# Patient Record
Sex: FEMALE | Race: BLACK OR AFRICAN AMERICAN | NOT HISPANIC OR LATINO | Employment: OTHER | URBAN - METROPOLITAN AREA
[De-identification: names, ages, dates, MRNs, and addresses within clinical notes are randomized per-mention and may not be internally consistent; named-entity substitution may affect disease eponyms.]

---

## 2017-10-05 ENCOUNTER — HISTORICAL (OUTPATIENT)
Dept: LAB | Facility: HOSPITAL | Age: 67
End: 2017-10-05

## 2017-10-05 LAB
ABS NEUT (OLG): 1.56 X10(3)/MCL (ref 2.1–9.2)
ALBUMIN SERPL-MCNC: 3.6 GM/DL (ref 3.4–5)
ALBUMIN/GLOB SERPL: 1 {RATIO}
ALP SERPL-CCNC: 66 UNIT/L (ref 45–117)
ALT SERPL-CCNC: 17 UNIT/L (ref 13–56)
AST SERPL-CCNC: 20 UNIT/L (ref 15–37)
BASOPHILS # BLD AUTO: 0.02 X10(3)/MCL (ref 0–0.2)
BASOPHILS NFR BLD AUTO: 0.5 % (ref 0–1)
BILIRUB SERPL-MCNC: 0.3 MG/DL (ref 0.2–1)
BILIRUBIN DIRECT+TOT PNL SERPL-MCNC: <0.1 MG/DL (ref 0–0.2)
BILIRUBIN DIRECT+TOT PNL SERPL-MCNC: >0.2 MG/DL (ref 0–1)
BUN SERPL-MCNC: 17 MG/DL (ref 7–18)
CALCIUM SERPL-MCNC: 8.7 MG/DL (ref 8.5–10.1)
CHLORIDE SERPL-SCNC: 105 MMOL/L (ref 98–107)
CHOLEST SERPL-MCNC: 283 MG/DL (ref 0–200)
CHOLEST/HDLC SERPL: 2.6 {RATIO} (ref 0–4)
CO2 SERPL-SCNC: 32 MMOL/L (ref 21–32)
CREAT SERPL-MCNC: 0.74 MG/DL (ref 0.55–1.02)
EOSINOPHIL # BLD AUTO: 0.17 X10(3)/MCL (ref 0–0.9)
EOSINOPHIL NFR BLD AUTO: 4.4 % (ref 0–6.4)
ERYTHROCYTE [DISTWIDTH] IN BLOOD BY AUTOMATED COUNT: 13.5 % (ref 11.5–17)
GLOBULIN SER-MCNC: 4 GM/DL (ref 2–4)
GLUCOSE SERPL-MCNC: 89 MG/DL (ref 74–106)
HCT VFR BLD AUTO: 40.9 % (ref 37–47)
HDLC SERPL-MCNC: 107 MG/DL (ref 35–60)
HGB BLD-MCNC: 13.1 GM/DL (ref 12–16)
IMM GRANULOCYTES # BLD AUTO: 0.01 10*3/UL (ref 0–0.02)
IMM GRANULOCYTES NFR BLD AUTO: 0.3 % (ref 0–0.43)
INR PPP: 0.9
LDLC SERPL CALC-MCNC: 163 MG/DL (ref 0–129)
LYMPHOCYTES # BLD AUTO: 1.63 X10(3)/MCL (ref 0.6–4.6)
LYMPHOCYTES NFR BLD AUTO: 41.9 % (ref 16–44)
MAGNESIUM SERPL-MCNC: 2.3 MG/DL (ref 1.8–2.4)
MCH RBC QN AUTO: 28.9 PG (ref 27–31)
MCHC RBC AUTO-ENTMCNC: 32 GM/DL (ref 33–36)
MCV RBC AUTO: 90.1 FL (ref 80–94)
MONOCYTES # BLD AUTO: 0.5 X10(3)/MCL (ref 0.1–1.3)
MONOCYTES NFR BLD AUTO: 12.9 % (ref 4–12.1)
NEUTROPHILS # BLD AUTO: 1.56 X10(3)/MCL (ref 2.1–9.2)
NEUTROPHILS NFR BLD AUTO: 40 % (ref 43–73)
NRBC BLD AUTO-RTO: 0 % (ref 0–0.2)
PLATELET # BLD AUTO: 204 X10(3)/MCL (ref 130–400)
PMV BLD AUTO: 11.1 FL (ref 7.4–10.4)
POTASSIUM SERPL-SCNC: 4.1 MMOL/L (ref 3.5–5.1)
PROT SERPL-MCNC: 7.6 GM/DL (ref 6.4–8.2)
PROTHROMBIN TIME: 10.1 SECOND(S) (ref 9.8–12)
RBC # BLD AUTO: 4.54 X10(6)/MCL (ref 4.2–5.4)
SODIUM SERPL-SCNC: 140 MMOL/L (ref 136–145)
TRIGL SERPL-MCNC: 66 MG/DL (ref 30–150)
TSH SERPL-ACNC: 1.55 MIU/ML (ref 0.36–3.74)
VLDLC SERPL CALC-MCNC: 13 MG/DL
WBC # SPEC AUTO: 3.9 X10(3)/MCL (ref 4.5–11.5)

## 2017-10-06 ENCOUNTER — HISTORICAL (OUTPATIENT)
Dept: SURGERY | Facility: HOSPITAL | Age: 67
End: 2017-10-06

## 2017-10-20 ENCOUNTER — HISTORICAL (OUTPATIENT)
Dept: SURGERY | Facility: HOSPITAL | Age: 67
End: 2017-10-20

## 2018-04-12 ENCOUNTER — HISTORICAL (OUTPATIENT)
Dept: LAB | Facility: HOSPITAL | Age: 68
End: 2018-04-12

## 2018-04-12 LAB
ABS NEUT (OLG): 10.22 X10(3)/MCL (ref 2.1–9.2)
ALBUMIN SERPL-MCNC: 3.8 GM/DL (ref 3.4–5)
ALBUMIN/GLOB SERPL: 1 {RATIO}
ALP SERPL-CCNC: 67 UNIT/L (ref 45–117)
ALT SERPL-CCNC: 18 UNIT/L (ref 13–56)
AST SERPL-CCNC: 14 UNIT/L (ref 15–37)
BASOPHILS # BLD AUTO: 0.01 X10(3)/MCL (ref 0–0.2)
BASOPHILS NFR BLD AUTO: 0.1 % (ref 0–1)
BILIRUB SERPL-MCNC: 0.6 MG/DL (ref 0.2–1)
BILIRUBIN DIRECT+TOT PNL SERPL-MCNC: 0.1 MG/DL (ref 0–0.2)
BILIRUBIN DIRECT+TOT PNL SERPL-MCNC: 0.5 MG/DL (ref 0–1)
BUN SERPL-MCNC: 21 MG/DL (ref 7–18)
CALCIUM SERPL-MCNC: 9.2 MG/DL (ref 8.5–10.1)
CHLORIDE SERPL-SCNC: 102 MMOL/L (ref 98–107)
CHOLEST SERPL-MCNC: 246 MG/DL (ref 0–200)
CHOLEST/HDLC SERPL: 2.1 {RATIO} (ref 0–4)
CO2 SERPL-SCNC: 30 MMOL/L (ref 21–32)
CREAT SERPL-MCNC: 0.73 MG/DL (ref 0.55–1.02)
EOSINOPHIL # BLD AUTO: 0.01 X10(3)/MCL (ref 0–0.9)
EOSINOPHIL NFR BLD AUTO: 0.1 % (ref 0–6.4)
ERYTHROCYTE [DISTWIDTH] IN BLOOD BY AUTOMATED COUNT: 13.8 % (ref 11.5–17)
EST. AVERAGE GLUCOSE BLD GHB EST-MCNC: 131 MG/DL
GLOBULIN SER-MCNC: 4 GM/DL (ref 2–4)
GLUCOSE SERPL-MCNC: 109 MG/DL (ref 74–106)
HBA1C MFR BLD: 6.2 % (ref 4.2–6.3)
HCT VFR BLD AUTO: 41.7 % (ref 37–47)
HDLC SERPL-MCNC: 116 MG/DL (ref 35–60)
HGB BLD-MCNC: 13.6 GM/DL (ref 12–16)
IMM GRANULOCYTES # BLD AUTO: 0.07 10*3/UL (ref 0–0.02)
IMM GRANULOCYTES NFR BLD AUTO: 0.6 % (ref 0–0.43)
INR PPP: 1.1 (ref 2–3)
LDLC SERPL CALC-MCNC: 117 MG/DL (ref 0–129)
LYMPHOCYTES # BLD AUTO: 0.82 X10(3)/MCL (ref 0.6–4.6)
LYMPHOCYTES NFR BLD AUTO: 7.1 % (ref 16–44)
MAGNESIUM SERPL-MCNC: 2.3 MG/DL (ref 1.8–2.4)
MCH RBC QN AUTO: 29.2 PG (ref 27–31)
MCHC RBC AUTO-ENTMCNC: 32.6 GM/DL (ref 33–36)
MCV RBC AUTO: 89.7 FL (ref 80–94)
MONOCYTES # BLD AUTO: 0.41 X10(3)/MCL (ref 0.1–1.3)
MONOCYTES NFR BLD AUTO: 3.6 % (ref 4–12.1)
NEUTROPHILS # BLD AUTO: 10.22 X10(3)/MCL (ref 2.1–9.2)
NEUTROPHILS NFR BLD AUTO: 88.5 % (ref 43–73)
NRBC BLD AUTO-RTO: 0 % (ref 0–0.2)
PLATELET # BLD AUTO: 216 X10(3)/MCL (ref 130–400)
PMV BLD AUTO: 11.3 FL (ref 7.4–10.4)
POTASSIUM SERPL-SCNC: 4.1 MMOL/L (ref 3.5–5.1)
PROT SERPL-MCNC: 8.1 GM/DL (ref 6.4–8.2)
PROTHROMBIN TIME: 10.9 SECOND(S) (ref 9.3–11.4)
RBC # BLD AUTO: 4.65 X10(6)/MCL (ref 4.2–5.4)
SODIUM SERPL-SCNC: 138 MMOL/L (ref 136–145)
TRIGL SERPL-MCNC: 63 MG/DL (ref 30–150)
TSH SERPL-ACNC: 0.39 MIU/ML (ref 0.36–3.74)
VLDLC SERPL CALC-MCNC: 13 MG/DL
WBC # SPEC AUTO: 11.5 X10(3)/MCL (ref 4.5–11.5)

## 2018-04-13 ENCOUNTER — HISTORICAL (OUTPATIENT)
Dept: ADMINISTRATIVE | Facility: HOSPITAL | Age: 68
End: 2018-04-13

## 2018-04-14 LAB
ABS NEUT (OLG): 3.94 X10(3)/MCL (ref 2.1–9.2)
BASOPHILS # BLD AUTO: 0 X10(3)/MCL (ref 0–0.2)
BASOPHILS NFR BLD AUTO: 0 % (ref 0–1)
BUN SERPL-MCNC: 17 MG/DL (ref 7–18)
CALCIUM SERPL-MCNC: 8.4 MG/DL (ref 8.5–10.1)
CHLORIDE SERPL-SCNC: 102 MMOL/L (ref 98–107)
CO2 SERPL-SCNC: 27 MMOL/L (ref 21–32)
CREAT SERPL-MCNC: 0.82 MG/DL (ref 0.55–1.02)
CREAT/UREA NIT SERPL: 21
EOSINOPHIL # BLD AUTO: 0.01 X10(3)/MCL (ref 0–0.9)
EOSINOPHIL NFR BLD AUTO: 0.2 % (ref 0–6.4)
ERYTHROCYTE [DISTWIDTH] IN BLOOD BY AUTOMATED COUNT: 13.5 % (ref 11.5–17)
GLUCOSE SERPL-MCNC: 160 MG/DL (ref 74–106)
HCT VFR BLD AUTO: 41.3 % (ref 37–47)
HGB BLD-MCNC: 13.5 GM/DL (ref 12–16)
IMM GRANULOCYTES # BLD AUTO: 0.03 10*3/UL (ref 0–0.02)
IMM GRANULOCYTES NFR BLD AUTO: 0.5 % (ref 0–0.43)
LYMPHOCYTES # BLD AUTO: 1.19 X10(3)/MCL (ref 0.6–4.6)
LYMPHOCYTES NFR BLD AUTO: 20.2 % (ref 16–44)
MCH RBC QN AUTO: 29.2 PG (ref 27–31)
MCHC RBC AUTO-ENTMCNC: 32.7 GM/DL (ref 33–36)
MCV RBC AUTO: 89.4 FL (ref 80–94)
MONOCYTES # BLD AUTO: 0.73 X10(3)/MCL (ref 0.1–1.3)
MONOCYTES NFR BLD AUTO: 12.4 % (ref 4–12.1)
NEUTROPHILS # BLD AUTO: 3.94 X10(3)/MCL (ref 2.1–9.2)
NEUTROPHILS NFR BLD AUTO: 66.7 % (ref 43–73)
NRBC BLD AUTO-RTO: 0 % (ref 0–0.2)
PLATELET # BLD AUTO: 185 X10(3)/MCL (ref 130–400)
PMV BLD AUTO: 11.2 FL (ref 7.4–10.4)
POTASSIUM SERPL-SCNC: 3.8 MMOL/L (ref 3.5–5.1)
RBC # BLD AUTO: 4.62 X10(6)/MCL (ref 4.2–5.4)
SODIUM SERPL-SCNC: 136 MMOL/L (ref 136–145)
WBC # SPEC AUTO: 5.9 X10(3)/MCL (ref 4.5–11.5)

## 2019-01-17 ENCOUNTER — HISTORICAL (OUTPATIENT)
Dept: HEPATOLOGY | Facility: HOSPITAL | Age: 69
End: 2019-01-17

## 2019-01-17 LAB — POC CREATININE: 0.6 MG/DL (ref 0.6–1.3)

## 2020-12-29 ENCOUNTER — HISTORICAL (OUTPATIENT)
Dept: MEDSURG UNIT | Facility: HOSPITAL | Age: 70
End: 2020-12-29

## 2020-12-30 LAB
ABS NEUT (OLG): 2.49 X10(3)/MCL (ref 2.1–9.2)
BASOPHILS # BLD AUTO: 0 X10(3)/MCL (ref 0–0.2)
BASOPHILS NFR BLD AUTO: 1 %
EOSINOPHIL # BLD AUTO: 0.1 X10(3)/MCL (ref 0–0.9)
EOSINOPHIL NFR BLD AUTO: 2 %
ERYTHROCYTE [DISTWIDTH] IN BLOOD BY AUTOMATED COUNT: 14.1 % (ref 11.5–17)
HCT VFR BLD AUTO: 39.1 % (ref 37–47)
HGB BLD-MCNC: 12.1 GM/DL (ref 12–16)
LYMPHOCYTES # BLD AUTO: 1.6 X10(3)/MCL (ref 0.6–4.6)
LYMPHOCYTES NFR BLD AUTO: 33 %
MCH RBC QN AUTO: 31.3 PG (ref 27–31)
MCHC RBC AUTO-ENTMCNC: 30.9 GM/DL (ref 33–36)
MCV RBC AUTO: 101.3 FL (ref 80–94)
MONOCYTES # BLD AUTO: 0.7 X10(3)/MCL (ref 0.1–1.3)
MONOCYTES NFR BLD AUTO: 14 %
NEUTROPHILS # BLD AUTO: 2.49 X10(3)/MCL (ref 2.1–9.2)
NEUTROPHILS NFR BLD AUTO: 50 %
PLATELET # BLD AUTO: 212 X10(3)/MCL (ref 130–400)
PMV BLD AUTO: 11.6 FL (ref 9.4–12.4)
RBC # BLD AUTO: 3.86 X10(6)/MCL (ref 4.2–5.4)
WBC # SPEC AUTO: 5 X10(3)/MCL (ref 4.5–11.5)

## 2022-04-30 NOTE — OP NOTE
DATE OF SURGERY:    10/06/2017    SURGEON:  Jayden Wilkinson DO    PREOPERATIVE DIAGNOSES:    1. Angina class 3.  2. Abnormal nuclear stress test.  3. History of mild to moderate coronary artery disease.  4. Hypertension.  5. Dyslipidemia.    POSTOPERATIVE DIAGNOSES:    1. Mild mid left anterior descending artery (LAD) coronary artery disease (CAD) with mild left circumflex and right coronary artery (RCA) coronary artery disease.    2. Preserved left ventricular systolic ejection fraction in the range of 65%.  3. Normal right heart pressures with pulmonary artery systolic pressure in the range of 25 mmHg.  4. Widely patent bilateral renal arteries.  5. Mild nonobstructive peripheral arterial disease in the bilateral lower extremities.  6. Valvular heart disease which includes moderate mitral regurgitation and moderate tricuspid regurgitation noted on the recent transesophageal echocardiogram.    COMPLICATIONS:  None.    ESTIMATED BLOOD LOSS:  None.    CONTRAST UTILIZED:  Approximately 172 mL.    PROCEDURE IN DETAIL:  The patient was brought to the cardiac catheterization laboratory today for the above-outlined procedures, which were performed after informed consent was obtained and using a sterile modified Seldinger technique and a 5-Ku[place]i arterial and 7-[place]i venous access to the right groin.  After access was gained to the right femoral vein utilizing a 7-[place]i sheath, access was gained to the right femoral artery with the 5-[place]i sheath.  We first proceeded with a right heart catheterization procedure which showed the patient to have normal right heart pressures, with pulmonary artery systolic pressure in the range of 25 mmHg.  Selective diagnostic angiography:  We then proceeded with selective diagnostic angiography, which showed the patient to have mild diffuse nonobstructive coronary artery disease throughout the right and left coronary arteries.  The mid LAD had some mild luminal irregularities.  In  this regard, we opted not to consider intravascular ultrasonography.  Left ventriculography showed a left ventricular ejection fraction in the range of 65%.  We then positioned the pigtail catheter in the abdominal aorta at the level of the renal arteries, and the angiogram revealed widely patent renal arteries bilaterally.  There was some evidence of atherosclerosis of the abdominal aorta, which was also noted on the transesophageal echocardiogram procedure.  There was also an otherwise normal appearance of the aortoiliac vessels down to the bilateral common femoral arteries.  The pigtail catheter was then positioned in the proximal portion of the right SFA, and angiogram revealed mild nonobstructive peripheral arterial disease throughout the left lower extremity.  The pigtail catheter was then positioned in the right external iliac artery, and angiogram revealed mild diffuse nonobstructive peripheral arterial disease throughout the right lower extremity.  With this, the procedure was completed successfully.  The patient tolerated the procedure rather well.  The patient left the cardiac catheterization laboratory in stable condition.        ______________________________  Jayden Wilkinson DO    CT/  DD:  10/06/2017  Time:  05:04PM  DT:  10/07/2017  Time:  12:10PM  Job #:  [id number]    cc: Jayden Wilkinson DO

## 2022-04-30 NOTE — DISCHARGE SUMMARY
DISCHARGE DATE:  10/06/2017    HOSPITAL COURSE:  Mrs. Espinoza was admitted for transesophageal echocardiography and right and left heart catheterization procedures due to abnormal findings noted on her outpatient noninvisive cv evaluation.  She first underwent transesophageal echocardiography and no significant valvular vegetation of the anterior mitral valve leaflet was noted.  Left ventricular systolic function is preserved and also patient noted to have tricuspid regurgitation.  She was also noted to have cor triatrium.  She underwent right and left heart catheterization procedures and found to have normal right heart pressures and mild coronary artery disease particularly mild coronary artery disease in the mid portion of the LAD.  Lower extremity arteriography also only showed mild peripheral arterial disease in the bilateral lower extremities.  Postoperative course has been unremarkable so far and allowed discharged home today.  Discharge laboratory data stable.    DISCHARGE DIAGNOSES:    1. Mild diffuse nonobstructive coronary artery disease.    2. Moderate atherosclerosis throughout the thoracoabdominal aorta.    3. Moderate mitral regurgitation.    4. Moderate tricuspid regurgitation.    5. Mild thickening of mitral valve leaflet without evidence of valvular vegetation.    6. Mild nonobstructive peripheral arterial disease in the bilateral lower extremities.    7. Dyslipidemia.    8. Moderate thoracoabdominal aorta atherosclerosis without any evidence of obstruction.  9. Hypertension.    RECOMMENDATIONS:  At this point Mrs. Espinoza should be ready for discharge home later this evening.  She will continue on the same medications as on admission.  She will call for any problems or complaints.  In the meantime, she will continue her medications as on admission.  I will also ask her to increase her Omega 3 fish oil to two pills twice daily in light of the dyslipidemia.  I am considering adding ________ to her  medical regimen trying to modify her cardiovascular risks.  We will continue guideline directed medical therapies for the coronary artery disease, peripheral vascular disease and valvular heart disease.  Further recommendations forthcoming.        ______________________________  DO MAYCO Singer  DD:  10/06/2017  Time:  05:18PM  DT:  10/09/2017  Time:  12:37PM  Job #:  [id number]

## 2022-04-30 NOTE — DISCHARGE SUMMARY
DISCHARGE DATE:  04/14/2018    HOSPITAL COURSE:  Ms. Espinoza was admitted for elective implantation of a dual chamber permanent pacemaker in light of symptomatic sick sinus syndrome.  She was being monitored with a Medronic LINQ implantable loop recorder.  As outlined in the operative report, she was brought to the cardiac catheterization laboratory earlier today and underwent successful implantation of a Medronic dual chamber permanent pacemaker in the left infraclavicular space.  She tolerated the procedure well.  We also explanted her Medronic LINQ implantable loop recorder.  She tolerated the procedures rather well.  Her postoperative course has been essentially unremarkable so far as she is being prepared for discharge home on 04/14/18.    DISCHARGE DIAGNOSES:    1. Symptomatic sick sinus syndrome.  2. Tachycardia/bradycardia syndrome.  3. Hypertension.  4. Dyslipidemia.  5. Explant of the Medronic LINQ implantable loop recorder.    RECOMMENDATIONS:  Ms. Espinoza will be arranged for discharge today to continue with the same medications as on admission.  I have asked her to call or return should she have any further problems or complaints.  In the meantime, she will continue on the same medications as on admission and will be given a prescription for empiric Cipro and Motrin as needed for pain.  I will be checking on her progress in my office in the very near future.  We will continue to monitor her progress closely and titrate her medical therapies as tolerated.  She will undergo complete re-evaluation to make sure that she is in stable condition prior to discharge.  We will check a post-procedure chest x-ray as well.  She will be given wound care instructions prior to discharge.  Further recommendations forthcoming.        ______________________________  Jayden Wilkinson DO    CT/PL  DD:  04/13/2018  Time:  08:06PM  DT:  04/15/2018  Time:  08:19AM  Job #:  [id number]

## 2022-04-30 NOTE — DISCHARGE SUMMARY
Patient:   Tonia Espinoza            MRN: [id number]            FIN: [id number]               Age:   68 years     Sex:  Female     :  1950   Associated Diagnoses:   None   Author:   Cata Kramer      Discharge Information      Discharge Summary Information   ADMIT/DISCHARGE DATE   Admit Date: 2018  Discharge Date: 2018     Physicians   Attending Physician - Minh VILLAFANA , Jayden KWON  Admitting Physician - Minh VILLAFANA , Jayden KWON  Consulting Physician - Jayden Wilkinson DO  Primary Care Physician - Humble Morris MD     Discharge Diagnosis   Presence of cardiac pacemaker (Z95.0)   Hypothyroidism, unspecified (E03.9)   Weakness (R53.1)   Sick sinus syndrome (I49.5)   Hyperlipidemia, unspecified (E78.5)   Essential (primary) hypertension (I10)   Gastro-esophageal reflux disease without esophagitis (K21.9)   Syncope and collapse (R55)      Immunizations   No immunizations recorded for this visit.     Discharge Medications   Prescribed  metoprolol (Metoprolol Succinate ER 25 mg oral tablet extended release) 25 mg, Oral, Daily  Continue  LORAzepam (LORazepam 1 mg oral tablet) 0.5 mg, Oral, As Indicated, PRN anxiety  alirocumab (Praluent Pen 75 mg/mL subcutaneous solution) 75 mg, Subcutaneous, q2wk  alpha-lipoic acid (Alpha Lipoic Acid 200 mg oral capsule) 200 mg, Oral, Daily  amLODIPine (amlodipine 10 mg oral tablet) 10 mg, Oral, Daily  apixaban (Eliquis 5 mg oral tablet) 5 mg, Oral, Daily  aspirin (Aspir 81 oral delayed release tablet) 81 mg, Oral, Daily  brimonidine ophthalmic (brimonidine 0.1% ophthalmic solution), BID  calcium-vitamin D (calcium (as carbonate)-vitamin D 600 mg-800 intl units oral tablet, chewable) 1 tab(s), Chewed, Daily  cinnamon (cinnamon 500 mg oral capsule) 1,000 mg, Oral, Daily  enalapril (enalapril 20 mg oral tablet) 20 mg, Oral, Daily  flax (Flax Seed Oil oral capsule) 1,000 mg, Oral, Daily  folic acid (folic acid 0.4 mg oral tablet) 0.4 mg, Oral,  Daily  garlic (garlic oral capsule) 1,000 mg, Oral, Daily  hydrochlorothiazide (hydrochlorothiazide 25 mg oral tablet) 25 mg, Oral, Daily  levothyroxine (levothyroxine 88 mcg (0.088 mg) oral tablet) 88 mcg, Oral, Daily  magnesium gluconate (magnesium gluconate 250 mg oral tablet) 250 mg, Oral, Daily  omega-3 polyunsaturated fatty acids (Omega-3 1000 mg oral capsule) 1,000 mg, Oral, Daily  pantoprazole (Pantoprazole 40 mg ORAL EC-Tablet) 40 mg, Oral, Daily  potassium chloride (potassium chloride 99 mg oral tablet) 99 mg, Oral, Daily  pravastatin (Pravastatin 20 mg Oral Tab) 20 mg, Oral, Daily  ranolazine (Ranexa 1000 mg oral tablet, extended release) 1,000 mg, Oral, BID  travoprost ophthalmic (Travatan 0.004% ophthalmic solution)  ubiquinone (Co Q-10) 400 mg, Oral, Daily  Discontinue  metoprolol (Metoprolol Succinate ER 25 mg oral tablet extended release) 12.5 mg, Oral, Daily        Education   Pacemaker Implantation, Adult  Pacemaker Implantation, Adult, Care After  Discharge - Home, MA home. Follow up with Dr. Wilkinson in 1-2 weeks. New RX to chart. Call office for questions or concerns. Plug in home monitoring system once she gets home.         Followup   Jayden Wilkinson DO, within 1 to 2 weeks   Keep scheduled appointment        Hospital Course   Hospital Course   Time Spent on Discharge: <30 minutes.               Ms. Espinoza was admitted for a scheduled Medtronic Pacemaker implantation/ Reveal Linq explantation for her history of significant SSS, PAF, and symptomatic bradycardia symptoms. On 4/13/18 she underwent a rather successful Medtronic Dual chamber placement with subsequent Medtronic Reveal Linq explantation.  Her post operative course is unremarkable, as he is being prepared for discharge home this am. Chest xray this am unremarkable. EKG showing atrial paced rhythm at 60 bpm. Per her am interrogation, she is 80% atrial paced with multiple PACs noted, atrial rate stabilization turned on by Medtronic rep.    Discharge laboratory data has been stable. I have instructed nurse to clean site with betadine and apply new dressing prior to DC.              Physical Examination   General:  Alert and oriented, No acute distress.    Eye:  Pupils are equal, round and reactive to light.    HENT:  Normocephalic.    Neck:  Supple, Non-tender, carotid bruit.    Respiratory:  Lungs are clear to auscultation, Respirations are non-labored, Breath sounds are equal, Symmetrical chest wall expansion, No chest wall tenderness.    Cardiovascular:  Normal rate, Regular rhythm, S1, Good pulses equal in all extremities, Normal peripheral perfusion, No edema, murmur noted.         Support: Pacemaker, LCW- no bleeding, hematoma, or signs of infection.    Gastrointestinal:  Soft, Non-tender, Non-distended.    Vital Signs   4/14/2018 6:59 CDT       Temperature Oral          36.9 DegC                             Temperature Oral (calculated)             98.42 DegF                             Peripheral Pulse Rate     60 bpm                             SpO2                      97 %                             Systolic Blood Pressure   162 mmHg  HI                             Diastolic Blood Pressure  91 mmHg  HI                             Mean Arterial Pressure, Cuff              115 mmHg     Genitourinary:  No costovertebral angle tenderness.    Lymphatics:  No lymphadenopathy neck, axilla, groin.    Musculoskeletal:  Normal range of motion, Normal strength, No tenderness, No swelling, Normal gait.    Integumentary:  Warm, Pink, Moist.    Neurologic:  Alert, Oriented, Normal sensory.    Psychiatric:  Cooperative, Appropriate mood & affect, Normal judgment.       Results Review   General results      Discharge Plan   Discharge Summary Plan   Discharge disposition: discharge to home.     Prescriptions: continue same medications, written and given to patient.      Ms. Espinoza  will be discharged home today.   We will arrange for follow up evaluation  in the office in 1-2 weeks where we will continue to monitor closely and titrate her medical therapies as tolerated.   I have asked him to call or return should he have any further problems or complaints.  In the meantime, he will continue on the same medications as on admission with the addition of antibiotics. Toprol has been increased to 25mg, to assist with arrythmias.

## 2022-12-02 DIAGNOSIS — R06.02 SHORTNESS OF BREATH: Primary | ICD-10-CM

## 2022-12-09 ENCOUNTER — HOSPITAL ENCOUNTER (OUTPATIENT)
Dept: PULMONOLOGY | Facility: HOSPITAL | Age: 72
Discharge: HOME | End: 2022-12-09
Attending: INTERNAL MEDICINE
Payer: MEDICARE

## 2022-12-09 DIAGNOSIS — R06.02 SHORTNESS OF BREATH: ICD-10-CM

## 2022-12-09 LAB
ERV LLN: -16449.44
ERV PRE REF: 51.2 %
ERV REF: 0.56
FEF 25 75 CHG: 31.2 %
FEF 25 75 LLN: 0.51
FEF 25 75 POST REF: 76 %
FEF 25 75 PRE REF: 57.9 %
FEF 25 75 REF: 1.4
FET100 CHG: -0.5 %
FEV1 CHG: 16.8 %
FEV1 FVC CHG: 0.9 %
FEV1 FVC LLN: 66
FEV1 FVC POST REF: 100.7 %
FEV1 FVC PRE REF: 99.8 %
FEV1 FVC REF: 79
FEV1 LLN: 1.06
FEV1 POST REF: 65.6 %
FEV1 PRE REF: 56.1 %
FEV1 REF: 1.54
FRCPLETH LLN: 1.61
FRCPLETH PREREF: 78.3 %
FRCPLETH REF: 2.43
FVC CHG: 15.7 %
FVC LLN: 1.37
FVC POST REF: 64.8 %
FVC PRE REF: 56 %
FVC REF: 1.96
MVV LLN: 52
MVV PRE REF: 53.5 %
MVV REF: 61
PEF CHG: 14.1 %
PEF LLN: 2.19
PEF POST REF: 87.5 %
PEF PRE REF: 76.7 %
PEF REF: 3.89
POST FEF 25 75: 1.06 L/S (ref 0.51–2.78)
POST FET 100: 6.37 SEC
POST FEV1 FVC: 79.46 % (ref 65.5–90.51)
POST FEV1: 1.01 L (ref 1.06–2)
POST FVC: 1.27 L (ref 1.37–2.59)
POST PEF: 3.41 L/S (ref 2.19–5.6)
PRE ERV: 0.29 L (ref -16449.44–16450.56)
PRE FEF 25 75: 0.81 L/S (ref 0.51–2.78)
PRE FET 100: 6.4 SEC
PRE FEV1 FVC: 78.72 % (ref 65.5–90.51)
PRE FEV1: 0.87 L (ref 1.06–2)
PRE FRC PL: 1.9 L (ref 1.61–3.25)
PRE FVC: 1.1 L (ref 1.37–2.59)
PRE MVV: 32.54 L/MIN (ref 51.66–69.9)
PRE PEF: 2.99 L/S (ref 2.19–5.6)
PRE RV: 1.61 L (ref 1.29–2.44)
PRE TLC: 2.74 L (ref 3.11–5.09)
RAW LLN: 3.06
RAW PRE REF: 265.6 %
RAW PRE: 8.13 CMH2O*S/L (ref 3.06–3.06)
RAW REF: 3.06
RV LLN: 1.29
RV PRE REF: 86.6 %
RV REF: 1.86
RVTLC LLN: 34
RVTLC PRE REF: 135.8 %
RVTLC PRE: 58.99 % (ref 33.85–53.03)
RVTLC REF: 43
TLC LLN: 3.11
TLC PRE REF: 66.7 %
TLC REF: 4.1
VC LLN: 1.37
VC PRE REF: 57.1 %
VC PRE: 1.12 L (ref 1.37–2.59)
VC REF: 1.96

## 2022-12-09 PROCEDURE — 94726 PLETHYSMOGRAPHY LUNG VOLUMES: CPT

## 2022-12-09 PROCEDURE — 94060 EVALUATION OF WHEEZING: CPT

## 2022-12-09 PROCEDURE — 94640 AIRWAY INHALATION TREATMENT: CPT | Mod: 59

## 2022-12-09 RX ORDER — ALBUTEROL SULFATE 0.83 MG/ML
2.5 SOLUTION RESPIRATORY (INHALATION) ONCE
Status: COMPLETED | OUTPATIENT
Start: 2022-12-09 | End: 2022-12-09

## 2022-12-09 RX ORDER — IPRATROPIUM BROMIDE 0.5 MG/2.5ML
0.5 SOLUTION RESPIRATORY (INHALATION) ONCE
Status: COMPLETED | OUTPATIENT
Start: 2022-12-09 | End: 2022-12-09

## 2022-12-09 RX ADMIN — ALBUTEROL SULFATE 2.5 MG: 0.83 SOLUTION RESPIRATORY (INHALATION) at 10:12

## 2022-12-09 RX ADMIN — IPRATROPIUM BROMIDE 0.5 MG: 0.5 SOLUTION RESPIRATORY (INHALATION) at 10:12

## 2023-03-30 ENCOUNTER — HOSPITAL ENCOUNTER (OUTPATIENT)
Dept: CARDIOLOGY | Facility: HOSPITAL | Age: 73
Discharge: HOME | End: 2023-03-30
Attending: INTERNAL MEDICINE | Admitting: INTERNAL MEDICINE
Payer: MEDICARE

## 2023-03-30 ENCOUNTER — ANESTHESIA (OUTPATIENT)
Dept: CARDIOLOGY | Facility: HOSPITAL | Age: 73
End: 2023-03-30
Payer: MEDICARE

## 2023-03-30 ENCOUNTER — ANESTHESIA EVENT (OUTPATIENT)
Dept: CARDIOLOGY | Facility: HOSPITAL | Age: 73
End: 2023-03-30
Payer: MEDICARE

## 2023-03-30 VITALS
TEMPERATURE: 98 F | SYSTOLIC BLOOD PRESSURE: 151 MMHG | HEART RATE: 60 BPM | HEIGHT: 60 IN | DIASTOLIC BLOOD PRESSURE: 88 MMHG | WEIGHT: 205 LBS | RESPIRATION RATE: 15 BRPM | OXYGEN SATURATION: 99 % | BODY MASS INDEX: 40.25 KG/M2

## 2023-03-30 DIAGNOSIS — I48.0 PAROXYSMAL ATRIAL FIBRILLATION: ICD-10-CM

## 2023-03-30 DIAGNOSIS — I48.91 ATRIAL FIBRILLATION: ICD-10-CM

## 2023-03-30 DIAGNOSIS — I48.0 PAROXYSMAL ATRIAL FIBRILLATION: Primary | ICD-10-CM

## 2023-03-30 LAB
ABS NEUT (OLG): 2.58 X10(3)/MCL (ref 2.1–9.2)
ALBUMIN SERPL-MCNC: 3.8 G/DL (ref 3.4–4.8)
ALBUMIN/GLOB SERPL: 1.3 RATIO (ref 1.1–2)
ALP SERPL-CCNC: 56 UNIT/L (ref 40–150)
ALT SERPL-CCNC: 27 UNIT/L (ref 0–55)
ANISOCYTOSIS BLD QL SMEAR: ABNORMAL
AST SERPL-CCNC: 23 UNIT/L (ref 5–34)
BASOPHILS NFR BLD MANUAL: 0.09 X10(3)/MCL (ref 0–0.2)
BASOPHILS NFR BLD MANUAL: 2 %
BILIRUBIN DIRECT+TOT PNL SERPL-MCNC: 0.6 MG/DL
BUN SERPL-MCNC: 12.6 MG/DL (ref 9.8–20.1)
CALCIUM SERPL-MCNC: 9.2 MG/DL (ref 8.4–10.2)
CHLORIDE SERPL-SCNC: 101 MMOL/L (ref 98–107)
CO2 SERPL-SCNC: 32 MMOL/L (ref 23–31)
CREAT SERPL-MCNC: 0.84 MG/DL (ref 0.55–1.02)
EOSINOPHIL NFR BLD MANUAL: 0.04 X10(3)/MCL (ref 0–0.9)
EOSINOPHIL NFR BLD MANUAL: 1 %
ERYTHROCYTE [DISTWIDTH] IN BLOOD BY AUTOMATED COUNT: 15.5 % (ref 11.5–17)
GFR SERPLBLD CREATININE-BSD FMLA CKD-EPI: >60 MLS/MIN/1.73/M2
GLOBULIN SER-MCNC: 2.9 GM/DL (ref 2.4–3.5)
GLUCOSE SERPL-MCNC: 131 MG/DL (ref 82–115)
HCT VFR BLD AUTO: 41.6 % (ref 37–47)
HGB BLD-MCNC: 13.4 G/DL (ref 12–16)
INSTRUMENT WBC (OLG): 4.3 X10(3)/MCL
LYMPHOCYTES NFR BLD MANUAL: 1.07 X10(3)/MCL
LYMPHOCYTES NFR BLD MANUAL: 25 %
MACROCYTES BLD QL SMEAR: ABNORMAL
MAGNESIUM SERPL-MCNC: 2.1 MG/DL (ref 1.6–2.6)
MCH RBC QN AUTO: 30.3 PG (ref 27–31)
MCHC RBC AUTO-ENTMCNC: 32.2 G/DL (ref 33–36)
MCV RBC AUTO: 94.1 FL (ref 80–94)
MONOCYTES NFR BLD MANUAL: 0.56 X10(3)/MCL (ref 0.1–1.3)
MONOCYTES NFR BLD MANUAL: 13 %
NEUTROPHILS NFR BLD MANUAL: 60 %
NRBC BLD AUTO-RTO: 0 %
PLATELET # BLD AUTO: 205 X10(3)/MCL (ref 130–400)
PLATELET # BLD EST: NORMAL 10*3/UL
PMV BLD AUTO: 10.9 FL (ref 7.4–10.4)
POIKILOCYTOSIS BLD QL SMEAR: ABNORMAL
POTASSIUM SERPL-SCNC: 3.1 MMOL/L (ref 3.5–5.1)
PROT SERPL-MCNC: 6.7 GM/DL (ref 5.8–7.6)
RBC # BLD AUTO: 4.42 X10(6)/MCL (ref 4.2–5.4)
RBC MORPH BLD: ABNORMAL
SODIUM SERPL-SCNC: 143 MMOL/L (ref 136–145)
TEAR DROP CELL (OLG): ABNORMAL
WBC # SPEC AUTO: 4.3 X10(3)/MCL (ref 4.5–11.5)

## 2023-03-30 PROCEDURE — 93010 EKG 12-LEAD: ICD-10-PCS | Mod: ,,, | Performed by: INTERNAL MEDICINE

## 2023-03-30 PROCEDURE — 63600175 PHARM REV CODE 636 W HCPCS: Performed by: ANESTHESIOLOGY

## 2023-03-30 PROCEDURE — 85025 COMPLETE CBC W/AUTO DIFF WBC: CPT | Performed by: INTERNAL MEDICINE

## 2023-03-30 PROCEDURE — 80053 COMPREHEN METABOLIC PANEL: CPT | Performed by: INTERNAL MEDICINE

## 2023-03-30 PROCEDURE — 25000003 PHARM REV CODE 250: Performed by: NURSE ANESTHETIST, CERTIFIED REGISTERED

## 2023-03-30 PROCEDURE — 93010 ELECTROCARDIOGRAM REPORT: CPT | Mod: ,,, | Performed by: INTERNAL MEDICINE

## 2023-03-30 PROCEDURE — 93005 ELECTROCARDIOGRAM TRACING: CPT

## 2023-03-30 PROCEDURE — 37000008 HC ANESTHESIA 1ST 15 MINUTES: Performed by: INTERNAL MEDICINE

## 2023-03-30 PROCEDURE — 83735 ASSAY OF MAGNESIUM: CPT | Performed by: INTERNAL MEDICINE

## 2023-03-30 PROCEDURE — 85027 COMPLETE CBC AUTOMATED: CPT | Performed by: INTERNAL MEDICINE

## 2023-03-30 PROCEDURE — 63600175 PHARM REV CODE 636 W HCPCS: Performed by: NURSE ANESTHETIST, CERTIFIED REGISTERED

## 2023-03-30 PROCEDURE — 92960 CARDIOVERSION ELECTRIC EXT: CPT

## 2023-03-30 RX ORDER — ALIROCUMAB 75 MG/ML
INJECTION, SOLUTION SUBCUTANEOUS
COMMUNITY
Start: 2023-03-16

## 2023-03-30 RX ORDER — METHOTREXATE 25 MG/ML
INJECTION, SOLUTION INTRA-ARTERIAL; INTRAMUSCULAR; INTRAVENOUS
COMMUNITY
Start: 2023-01-09

## 2023-03-30 RX ORDER — LIDOCAINE HYDROCHLORIDE 20 MG/ML
INJECTION, SOLUTION EPIDURAL; INFILTRATION; INTRACAUDAL; PERINEURAL
Status: DISCONTINUED | OUTPATIENT
Start: 2023-03-30 | End: 2023-03-30

## 2023-03-30 RX ORDER — APIXABAN 5 MG/1
5 TABLET, FILM COATED ORAL 2 TIMES DAILY
COMMUNITY
Start: 2023-03-08

## 2023-03-30 RX ORDER — BRIMONIDINE TARTRATE 2 MG/ML
1 SOLUTION/ DROPS OPHTHALMIC 2 TIMES DAILY
COMMUNITY
Start: 2023-02-16

## 2023-03-30 RX ORDER — CETIRIZINE HYDROCHLORIDE 10 MG/1
10 TABLET ORAL 2 TIMES DAILY
COMMUNITY
Start: 2023-01-20

## 2023-03-30 RX ORDER — NITROGLYCERIN 0.4 MG/1
TABLET SUBLINGUAL
COMMUNITY

## 2023-03-30 RX ORDER — CLOPIDOGREL BISULFATE 75 MG/1
75 TABLET ORAL DAILY
COMMUNITY
Start: 2023-03-08

## 2023-03-30 RX ORDER — PROPOFOL 10 MG/ML
VIAL (ML) INTRAVENOUS
Status: DISCONTINUED | OUTPATIENT
Start: 2023-03-30 | End: 2023-03-30

## 2023-03-30 RX ORDER — LEVOTHYROXINE SODIUM 88 UG/1
88 TABLET ORAL DAILY
COMMUNITY
Start: 2023-01-16

## 2023-03-30 RX ORDER — DORZOLAMIDE HCL 20 MG/ML
1 SOLUTION/ DROPS OPHTHALMIC 2 TIMES DAILY
COMMUNITY
Start: 2023-02-16

## 2023-03-30 RX ORDER — AMIODARONE HYDROCHLORIDE 200 MG/1
400 TABLET ORAL 2 TIMES DAILY
Status: ON HOLD | COMMUNITY
Start: 2023-03-09 | End: 2023-05-22 | Stop reason: HOSPADM

## 2023-03-30 RX ORDER — ACETAMINOPHEN 10 MG/ML
1000 INJECTION, SOLUTION INTRAVENOUS ONCE
Status: CANCELLED | OUTPATIENT
Start: 2023-03-30 | End: 2023-03-30

## 2023-03-30 RX ORDER — DOCUSATE SODIUM 100 MG/1
1 CAPSULE, LIQUID FILLED ORAL
COMMUNITY

## 2023-03-30 RX ORDER — MULTIVITAMIN
1 TABLET ORAL
COMMUNITY

## 2023-03-30 RX ORDER — FENTANYL CITRATE 50 UG/ML
25 INJECTION, SOLUTION INTRAMUSCULAR; INTRAVENOUS EVERY 5 MIN PRN
Status: CANCELLED | OUTPATIENT
Start: 2023-03-30

## 2023-03-30 RX ORDER — LORAZEPAM 0.5 MG/1
0.5 TABLET ORAL 2 TIMES DAILY PRN
COMMUNITY
Start: 2023-02-23

## 2023-03-30 RX ORDER — TORSEMIDE 20 MG/1
20 TABLET ORAL DAILY
COMMUNITY
Start: 2023-03-03

## 2023-03-30 RX ORDER — PANTOPRAZOLE SODIUM 40 MG/1
40 TABLET, DELAYED RELEASE ORAL DAILY
COMMUNITY
Start: 2022-12-05

## 2023-03-30 RX ORDER — METOPROLOL SUCCINATE 100 MG/1
100 TABLET, EXTENDED RELEASE ORAL DAILY
COMMUNITY
Start: 2023-03-20

## 2023-03-30 RX ORDER — GABAPENTIN 100 MG/1
100 CAPSULE ORAL DAILY
COMMUNITY
Start: 2023-03-18

## 2023-03-30 RX ORDER — LATANOPROST 50 UG/ML
1 SOLUTION/ DROPS OPHTHALMIC NIGHTLY
COMMUNITY
Start: 2023-02-16

## 2023-03-30 RX ORDER — SECUKINUMAB 150 MG/ML
1 INJECTION SUBCUTANEOUS
COMMUNITY

## 2023-03-30 RX ORDER — LIDOCAINE HYDROCHLORIDE 10 MG/ML
1 INJECTION, SOLUTION EPIDURAL; INFILTRATION; INTRACAUDAL; PERINEURAL ONCE
Status: DISCONTINUED | OUTPATIENT
Start: 2023-03-30 | End: 2023-03-31 | Stop reason: HOSPADM

## 2023-03-30 RX ORDER — FOLIC ACID 1 MG/1
1 TABLET ORAL DAILY
COMMUNITY

## 2023-03-30 RX ORDER — RANOLAZINE 500 MG/1
500 TABLET, EXTENDED RELEASE ORAL 2 TIMES DAILY
COMMUNITY
Start: 2023-01-15

## 2023-03-30 RX ORDER — ENALAPRIL MALEATE 20 MG/1
20 TABLET ORAL DAILY
COMMUNITY
Start: 2023-01-15

## 2023-03-30 RX ORDER — EVOLOCUMAB 140 MG/ML
INJECTION, SOLUTION SUBCUTANEOUS
COMMUNITY
Start: 2022-12-27

## 2023-03-30 RX ORDER — DIPHENHYDRAMINE HYDROCHLORIDE 50 MG/ML
12.5 INJECTION INTRAMUSCULAR; INTRAVENOUS ONCE
Status: COMPLETED | OUTPATIENT
Start: 2023-03-30 | End: 2023-03-30

## 2023-03-30 RX ORDER — SODIUM CHLORIDE 0.9 % (FLUSH) 0.9 %
10 SYRINGE (ML) INJECTION
Status: CANCELLED | OUTPATIENT
Start: 2023-03-30

## 2023-03-30 RX ORDER — BRINZOLAMIDE/BRIMONIDINE TARTRATE 10; 2 MG/ML; MG/ML
1 SUSPENSION/ DROPS OPHTHALMIC 3 TIMES DAILY
COMMUNITY
Start: 2023-01-23

## 2023-03-30 RX ORDER — GLUCOSAMINE HCL 500 MG
1 TABLET ORAL DAILY
COMMUNITY

## 2023-03-30 RX ADMIN — DIPHENHYDRAMINE HYDROCHLORIDE 12.5 MG: 50 INJECTION, SOLUTION INTRAMUSCULAR; INTRAVENOUS at 09:03

## 2023-03-30 RX ADMIN — PROPOFOL 50 MG: 10 INJECTION, EMULSION INTRAVENOUS at 10:03

## 2023-03-30 RX ADMIN — LIDOCAINE HYDROCHLORIDE 50 MG: 20 INJECTION, SOLUTION EPIDURAL; INFILTRATION; INTRACAUDAL; PERINEURAL at 10:03

## 2023-03-30 NOTE — ANESTHESIA POSTPROCEDURE EVALUATION
Anesthesia Post Evaluation    Patient: Tonia Espinoza    Procedure(s) Performed: * No procedures listed *    Final Anesthesia Type: general      Patient location during evaluation: Cath Lab  Patient participation: Yes- Able to Participate  Level of consciousness: awake and alert  Post-procedure vital signs: reviewed and stable  Pain management: adequate  Airway patency: patent    PONV status at discharge: No PONV  Anesthetic complications: no      Cardiovascular status: blood pressure returned to baseline  Respiratory status: spontaneous ventilation and unassisted  Hydration status: euvolemic  Follow-up needed           Vitals Value Taken Time   /95 03/30/23 0755   Temp 36.8 °C (98.2 °F) 03/30/23 0755   Pulse 89 03/30/23 0755   Resp 98 03/30/23 1027   SpO2 96 % 03/30/23 0755         No case tracking events are documented in the log.      Pain/Jesica Score: No data recorded

## 2023-03-30 NOTE — ANESTHESIA PREPROCEDURE EVALUATION
03/30/2023  Tonia Espinoza is a 73 y.o., female.      Pre-op Assessment    I have reviewed the Patient Summary Reports.     I have reviewed the Nursing Notes. I have reviewed the NPO Status.   I have reviewed the Medications.     Review of Systems  Anesthesia Hx:  No problems with previous Anesthesia    Social:  Non-Smoker    Cardiovascular:   Hypertension Denies MI. CAD   Dysrhythmias atrial fibrillation  Denies Angina.  Denies CHF. hyperlipidemia    Pulmonary:   Denies COPD.  Denies Asthma. Shortness of breath    Hepatic/GI:   Denies GERD. Denies Liver Disease.  Denies Hepatitis.    Musculoskeletal:   Arthritis (rheumatoid arthritis)     Neurological:   TIA, (2020 - left face) Denies CVA. Denies Seizures.    Endocrine:   Denies Diabetes. Hypothyroidism  Obesity / BMI > 30      Physical Exam  General: Well nourished, Cooperative, Alert and Oriented    Airway:  Mallampati: I   Mouth Opening: Normal  TM Distance: Normal  Tongue: Normal  Neck ROM: Normal ROM, Extension Decreased    Dental:  Intact        Anesthesia Plan  Type of Anesthesia, risks & benefits discussed:    Anesthesia Type: Gen Natural Airway  Intra-op Monitoring Plan: Standard ASA Monitors  Induction:  IV  Informed Consent: Informed consent signed with the Patient and all parties understand the risks and agree with anesthesia plan.  All questions answered.   ASA Score: 3  Day of Surgery Review of History & Physical: H&P Update referred to the surgeon/provider.    Ready For Surgery From Anesthesia Perspective.     .

## 2023-03-30 NOTE — DISCHARGE SUMMARY
[facility] - Cath Lab Services  Discharge Note  Short Stay    Cardioversion      OUTCOME: Patient tolerated treatment/procedure well without complication and is now ready for discharge.    DISPOSITION: Home or Self Care    FINAL DIAGNOSIS:  Atrial fibrillation    FOLLOWUP: In clinic    DISCHARGE INSTRUCTIONS:  No discharge procedures on file.     TIME SPENT ON DISCHARGE: 15 minutes

## 2023-03-30 NOTE — TRANSFER OF CARE
Anesthesia Transfer of Care Note    Patient: Tonia Espinoza    Procedure(s) Performed: * No procedures listed *    Patient location: Cath Lab    Anesthesia Type: MAC    Transport from OR: Transported from OR on room air with adequate spontaneous ventilation    Post pain: adequate analgesia    Post assessment: no apparent anesthetic complications    Post vital signs: stable    Level of consciousness: alert and awake    Nausea/Vomiting: no nausea/vomiting    Complications: none    Transfer of care protocol was followed      Last vitals:   Visit Vitals  BP (!) 153/95   Pulse 89   Temp 36.8 °C (98.2 °F) (Oral)   Ht 5' (1.524 m)   Wt 93 kg (205 lb 0.4 oz)   SpO2 96%   Breastfeeding No   BMI 40.04 kg/m²

## 2023-05-22 ENCOUNTER — HOSPITAL ENCOUNTER (OUTPATIENT)
Facility: HOSPITAL | Age: 73
Discharge: HOME | End: 2023-05-22
Attending: INTERNAL MEDICINE | Admitting: INTERNAL MEDICINE
Payer: MEDICARE

## 2023-05-22 VITALS
HEART RATE: 89 BPM | RESPIRATION RATE: 24 BRPM | BODY MASS INDEX: 39.95 KG/M2 | SYSTOLIC BLOOD PRESSURE: 136 MMHG | OXYGEN SATURATION: 97 % | WEIGHT: 203.5 LBS | TEMPERATURE: 98 F | HEIGHT: 60 IN | DIASTOLIC BLOOD PRESSURE: 97 MMHG

## 2023-05-22 DIAGNOSIS — I48.19 PERSISTENT ATRIAL FIBRILLATION: ICD-10-CM

## 2023-05-22 DIAGNOSIS — I48.91 ATRIAL FIBRILLATION: ICD-10-CM

## 2023-05-22 PROCEDURE — 93650 ICAR CATH ABLTJ AV NODE FUNC: CPT | Performed by: INTERNAL MEDICINE

## 2023-05-22 PROCEDURE — 93287 PERI-PX DEVICE EVAL & PRGR: CPT | Performed by: INTERNAL MEDICINE

## 2023-05-22 PROCEDURE — 99153 MOD SED SAME PHYS/QHP EA: CPT | Performed by: INTERNAL MEDICINE

## 2023-05-22 PROCEDURE — C1894 INTRO/SHEATH, NON-LASER: HCPCS | Performed by: INTERNAL MEDICINE

## 2023-05-22 PROCEDURE — 27201423 OPTIME MED/SURG SUP & DEVICES STERILE SUPPLY: Performed by: INTERNAL MEDICINE

## 2023-05-22 PROCEDURE — 93600 BUNDLE OF HIS RECORDING: CPT | Performed by: INTERNAL MEDICINE

## 2023-05-22 PROCEDURE — 93005 ELECTROCARDIOGRAM TRACING: CPT

## 2023-05-22 PROCEDURE — C1730 CATH, EP, 19 OR FEW ELECT: HCPCS | Performed by: INTERNAL MEDICINE

## 2023-05-22 PROCEDURE — 99152 MOD SED SAME PHYS/QHP 5/>YRS: CPT | Performed by: INTERNAL MEDICINE

## 2023-05-22 PROCEDURE — C1760 CLOSURE DEV, VASC: HCPCS | Performed by: INTERNAL MEDICINE

## 2023-05-22 PROCEDURE — 63600175 PHARM REV CODE 636 W HCPCS: Performed by: INTERNAL MEDICINE

## 2023-05-22 PROCEDURE — 25000003 PHARM REV CODE 250: Performed by: INTERNAL MEDICINE

## 2023-05-22 DEVICE — SYS CLSR VASCADE MVP ID6-12FR: Type: IMPLANTABLE DEVICE | Site: GROIN | Status: FUNCTIONAL

## 2023-05-22 RX ORDER — ENOXAPARIN SODIUM 100 MG/ML
INJECTION SUBCUTANEOUS
Status: ON HOLD | COMMUNITY
Start: 2023-05-12 | End: 2023-05-22 | Stop reason: HOSPADM

## 2023-05-22 RX ORDER — LIDOCAINE HYDROCHLORIDE 10 MG/ML
INJECTION INFILTRATION; PERINEURAL
Status: DISCONTINUED | OUTPATIENT
Start: 2023-05-22 | End: 2023-05-22 | Stop reason: HOSPADM

## 2023-05-22 RX ORDER — HYDROCODONE BITARTRATE AND ACETAMINOPHEN 5; 325 MG/1; MG/1
1 TABLET ORAL EVERY 4 HOURS PRN
Status: DISCONTINUED | OUTPATIENT
Start: 2023-05-22 | End: 2023-05-22 | Stop reason: HOSPADM

## 2023-05-22 RX ORDER — FENTANYL CITRATE 50 UG/ML
INJECTION, SOLUTION INTRAMUSCULAR; INTRAVENOUS
Status: DISCONTINUED | OUTPATIENT
Start: 2023-05-22 | End: 2023-05-22 | Stop reason: HOSPADM

## 2023-05-22 RX ORDER — MORPHINE SULFATE 4 MG/ML
2 INJECTION, SOLUTION INTRAMUSCULAR; INTRAVENOUS EVERY 4 HOURS PRN
Status: DISCONTINUED | OUTPATIENT
Start: 2023-05-22 | End: 2023-05-22 | Stop reason: HOSPADM

## 2023-05-22 RX ORDER — PROMETHAZINE HYDROCHLORIDE 25 MG/ML
INJECTION, SOLUTION INTRAMUSCULAR; INTRAVENOUS
Status: DISCONTINUED | OUTPATIENT
Start: 2023-05-22 | End: 2023-05-22 | Stop reason: HOSPADM

## 2023-05-22 RX ORDER — ALBUTEROL SULFATE 90 UG/1
2 AEROSOL, METERED RESPIRATORY (INHALATION) EVERY 4 HOURS PRN
COMMUNITY
Start: 2022-11-15

## 2023-05-22 RX ORDER — DIPHENHYDRAMINE HYDROCHLORIDE 50 MG/ML
INJECTION INTRAMUSCULAR; INTRAVENOUS
Status: DISCONTINUED | OUTPATIENT
Start: 2023-05-22 | End: 2023-05-22 | Stop reason: HOSPADM

## 2023-05-22 RX ORDER — ACETAMINOPHEN 325 MG/1
650 TABLET ORAL EVERY 4 HOURS PRN
Status: DISCONTINUED | OUTPATIENT
Start: 2023-05-22 | End: 2023-05-22 | Stop reason: HOSPADM

## 2023-05-22 RX ORDER — MIDAZOLAM HYDROCHLORIDE 1 MG/ML
INJECTION INTRAMUSCULAR; INTRAVENOUS
Status: DISCONTINUED | OUTPATIENT
Start: 2023-05-22 | End: 2023-05-22 | Stop reason: HOSPADM

## 2023-05-22 NOTE — Clinical Note
vein was performed. A percutaneous stick to the right groin was performed. Ultrasound guidance was used to obtain access.

## 2023-05-22 NOTE — DISCHARGE INSTRUCTIONS
-Remove dressing in 24hrs  -No driving for two Days  -Do not lift anything heavier than a gallon of milk for 5 days  -No tub bathing for 5 days (if you have a groin site).   -No lotions, powders, creams around site for 5 days  - Return to the nearest emergency room if you start running a fever; have any kind of discharge coming from the site, the site looks red or swollen.  - If site starts to bleed, lay flat on the ground, apply pressure to the site and call 911.   Resume Eliquis tonight.

## 2023-05-22 NOTE — DISCHARGE SUMMARY
[facility] - Cath Lab Services  Discharge Note  Short Stay    Procedure(s) (LRB):  Ablation (N/A)      OUTCOME: Patient tolerated treatment/procedure well without complication and is now ready for discharge.    DISPOSITION: Home or Self Care    FINAL DIAGNOSIS:  Per AF    FOLLOWUP: In clinic    DISCHARGE INSTRUCTIONS:  No discharge procedures on file.     TIME SPENT ON DISCHARGE: 15 minutes

## 2024-08-28 ENCOUNTER — HOSPITAL ENCOUNTER (OUTPATIENT)
Dept: TELEMEDICINE | Facility: HOSPITAL | Age: 74
Discharge: HOME | End: 2024-08-28
Payer: MEDICARE

## 2024-08-28 DIAGNOSIS — R53.1 ACUTE RIGHT-SIDED WEAKNESS: Primary | ICD-10-CM

## 2024-08-28 NOTE — SUBJECTIVE & OBJECTIVE
HPI:  74 y.o. female with HTN, A fib taken off apixaban for surgical procedure four days ago, R frontal infarct without residual deficits, presents with acute onset R sided weakness, R face weakness, and slurred speech.  LKW at 2 am today. No improving.     Images personally reviewed and interpreted:  Study: Head CT  Awaiting CTA head and neck to be uploaded for review.    Assessment and Plan:  Acute R brain infarct, most likely cardio embolic in the setting of known a fib off apixaban for recent surgical procedure.  Out of the window for TNK. Awaiting CTA brain to determine eligibility for MT.  If no LVO noted, [place] [facility] [place] and complete stroke work up with MRI brain, TTE, lipid panel, hemoglobin A1c.  Neurology, PT/OT and speech therapy consults.     Lytics recommendation: Thrombolytic therapy not recommended due to Outside of treatment window   Thrombectomy recommendation: A[place]ing [place] [facility] [place]   Placement recommendation: pending further studies

## 2024-08-28 NOTE — TELEMEDICINE CONSULT
[facility]  Vascular Neurology  Comprehensive Stroke Center  TeleVascular Neurology Acute Consultation Note        Consult Information  Consults    Consulting Provider: AYO CORTEZ   Current Providers  No providers found    Patient Location: [facility] TELEMEDICINE ED [facility] PATIENT [facility]OW [facility] Emergency Department    Spoke hospital nurse at bedside with patient assisting consultant.  Patient information was obtained from patient, past medical records, and primary team.       Stroke Documentation  Acute Stroke Times   Last Known Normal Date: 08/28/24  Last Known Normal Time: 0200  Symptom Onset Date: 08/28/24  Symptom Onset Time: 0200  Stroke Team Called Date: 08/28/24  Stroke Team Called Time: 0901  Stroke Team Arrival Date: 08/28/24  Stroke Team Arrival Time: 0907  CT Interpretation Time: 0918  Thrombolytic Therapy Recommended: No  Thrombectomy Recommended:  (Pending CTA)    NIH Scale:  Interval: baseline  1a. Level of Consciousness: 0-->Alert, keenly responsive  1b. LOC Questions: 0-->Answers both questions correctly  1c. LOC Commands: 0-->Performs both tasks correctly  2. Best Gaze: 0-->Normal  3. Visual: 0-->No visual loss  4. Facial Palsy: 2-->Partial paralysis (total or near-total paralysis of lower face)  5a. Motor Arm, Left: 2-->Some effort against gravity, limb cannot get to or maintain (if cued) 90 (or 45) degrees, drifts down to bed, but has some effort against gravity  5b. Motor Arm, Right: 0-->No drift, limb holds 90 (or 45) degrees for full 10 secs  6a. Motor Leg, Left: 3-->No effort against gravity, leg falls to bed immediately  6b. Motor Leg, Right: 0-->No drift, leg holds 30 degree position for full 5 secs  7. Limb Ataxia: 0-->Absent  8. Sensory: 0-->Normal, no sensory loss  9. Best Language: 0-->No aphasia, normal  10. Dysarthria: 1-->Mild-to-moderate dysarthria, patient slurs at least some words and, at worst, can be understood with some difficulty  11.  Extinction and Inattention (formerly Neglect): 0-->No abnormality  Total (NIH Stroke Scale): 8      Modified [place]: Score: 1  [place] Coma Scale: 15   ABCD2 Score:    [id number] Score: 5  HAS -BLED Score:    ICH Score:    Hunt & Bradford Classification:      There were no vitals taken for this visit.      In my opinion, this was a: Tier 2; VAN Stroke Assessment: Negative     Medical Decision Making  HPI:  74 y.o. female with HTN, A fib taken off apixaban for surgical procedure four days ago, R frontal infarct without residual deficits, presents with acute onset R sided weakness, R face weakness, and slurred speech.  LKW at 2 am today. No improving.     Images personally reviewed and interpreted:  Study: Head CT  Awaiting CTA head and neck to be uploaded for review.    Assessment and Plan:  Acute R brain infarct, most likely cardio embolic in the setting of known a fib off apixaban for recent surgical procedure.  Out of the window for TNK. Awaiting CTA brain to determine eligibility for MT.  If no LVO noted, admit to [facility] facility and complete stroke work up with MRI brain, TTE, lipid panel, hemoglobin A1c.  Neurology, PT/OT and speech therapy consults.     Lytics recommendation: Thrombolytic therapy not recommended due to Outside of treatment window   Thrombectomy recommendation: Awaiting CTA results from [facility] for determination   Placement recommendation: pending further studies               ROS  Physical Exam  Past Medical History:   Diagnosis Date    Atrial fibrillation     Coronary artery disease     Hyperlipidemia     Hypertension     Rheumatoid arthritis, unspecified     Stroke     Thyroid disease      Past Surgical History:   Procedure Laterality Date    ABLATION N/A 5/22/2023    Procedure: Ablation;  Surgeon: Shivam Dutta MD;  Location: [facility] CATH LAB;  Service: Cardiology;  Laterality: N/A;  EPS + AV NODE ABLATION W/ MOD SED    CARDIAC DEFIBRILLATOR PLACEMENT      CATARACT EXTRACTION      HYSTERECTOMY       SINUS SURGERY       No family history on file.    Diagnoses  Problem Noted   Acute Right-Sided Weakness 8/28/2024       Erick Bahena MD      Emergent/Acute neurological consultation requested by spoke provider due to critical concerns for possible cerebrovascular event that could result in permanent loss of neurologic/bodily function, severe disability or death of this patient.  Immediate/timely evaluation by a highly prepared expert is paramount for optimal outcomes  High risk for neurological deterioration if not properly diagnosed  High risk for neurological deterioration if not treated promplty/as soon as possible  Complex diagnostic evaluation may be required (advanced imaging)  High risk treatment options (thrombolytics and/or thrombectomy)    Patient care was coordinated with spoke provider, including but not limted to    Discussing likely diagnosis/etiology of symptoms  Making recommendations for further diagnostic studies  Making recommendations for intravenous thrombolytics or other advanced therapies  Making recommendations for disposition (admission/transfer for higher level of care)

## (undated) DEVICE — KIT ENSITE ELECTRODE SURFACE

## (undated) DEVICE — SHEATH BRITE TIP INTRO 11CM 9F

## (undated) DEVICE — Device

## (undated) DEVICE — R CATH SUPRM QPLR CRD-2 6F 120

## (undated) DEVICE — SET TUBING COOL POINT IRR

## (undated) DEVICE — ELECTRODE REM PLYHSV RETURN 9

## (undated) DEVICE — COVER PROBE US 5.5X58L NON LTX